# Patient Record
Sex: FEMALE | Race: ASIAN | NOT HISPANIC OR LATINO
[De-identification: names, ages, dates, MRNs, and addresses within clinical notes are randomized per-mention and may not be internally consistent; named-entity substitution may affect disease eponyms.]

---

## 2017-08-11 PROBLEM — Z00.00 ENCOUNTER FOR PREVENTIVE HEALTH EXAMINATION: Status: ACTIVE | Noted: 2017-08-11

## 2017-08-24 ENCOUNTER — APPOINTMENT (OUTPATIENT)
Dept: ORTHOPEDIC SURGERY | Facility: CLINIC | Age: 47
End: 2017-08-24
Payer: COMMERCIAL

## 2017-08-24 VITALS — WEIGHT: 170 LBS | BODY MASS INDEX: 28.32 KG/M2 | HEIGHT: 65 IN

## 2017-08-24 DIAGNOSIS — S73.191A OTHER SPRAIN OF RIGHT HIP, INITIAL ENCOUNTER: ICD-10-CM

## 2017-08-24 PROCEDURE — 73502 X-RAY EXAM HIP UNI 2-3 VIEWS: CPT | Mod: RT

## 2017-08-24 PROCEDURE — 99204 OFFICE O/P NEW MOD 45 MIN: CPT

## 2017-09-04 PROBLEM — S73.191A TEAR OF RIGHT ACETABULAR LABRUM, INITIAL ENCOUNTER: Status: ACTIVE | Noted: 2017-09-04

## 2019-11-19 ENCOUNTER — APPOINTMENT (OUTPATIENT)
Dept: NEUROLOGY | Facility: CLINIC | Age: 49
End: 2019-11-19
Payer: COMMERCIAL

## 2019-11-19 VITALS
HEART RATE: 75 BPM | SYSTOLIC BLOOD PRESSURE: 106 MMHG | WEIGHT: 161 LBS | HEIGHT: 65 IN | BODY MASS INDEX: 26.82 KG/M2 | DIASTOLIC BLOOD PRESSURE: 72 MMHG

## 2019-11-19 DIAGNOSIS — Z78.9 OTHER SPECIFIED HEALTH STATUS: ICD-10-CM

## 2019-11-19 DIAGNOSIS — Z86.39 PERSONAL HISTORY OF OTHER ENDOCRINE, NUTRITIONAL AND METABOLIC DISEASE: ICD-10-CM

## 2019-11-19 DIAGNOSIS — Z86.2 PERSONAL HISTORY OF DISEASES OF THE BLOOD AND BLOOD-FORMING ORGANS AND CERTAIN DISORDERS INVOLVING THE IMMUNE MECHANISM: ICD-10-CM

## 2019-11-19 PROCEDURE — 99204 OFFICE O/P NEW MOD 45 MIN: CPT

## 2019-11-19 RX ORDER — MULTIVITAMIN
TABLET ORAL
Refills: 0 | Status: ACTIVE | COMMUNITY

## 2019-11-19 NOTE — REVIEW OF SYSTEMS
[Fever] : fever [Abnormal Sensation] : an abnormal sensation [Hypersensitivity] : hypersensitivity [As Noted in HPI] : as noted in HPI [Negative] : Endocrine

## 2019-11-19 NOTE — HISTORY OF PRESENT ILLNESS
[FreeTextEntry1] : 49-year-old female with PMH remarkable for mild hyperlipidemia, otherwise healthy, reports that 3 weeks ago she started noticing low grade fevers ranging  usually in the evenings, in addition, she also noted dull headache that started localizing to left temporal region, the headaches occurred a few eyes every day usually in the evenings, headaches were monitored and throbbing in character, she reports\par  2-3 episodes of transient left eye visual blurring while she was driving, which resolves within seconds and reverted back to normal; patient denied jaw claudication, vertigo, nausea, vomiting, paresthesias/weakness of upper and lower extremities or gait instability.\par \par Patient was evaluated by her PMD, labs done revealed ESR 49, CRP > 10, she was started on prednisone 60 mg daily, has been taking it for the last 10 days. \par \par Patient reports she has not had any more fever, denies visual blurring but continues to have dull aching sensation in the left temporal region.

## 2019-11-19 NOTE — PHYSICAL EXAM
[General Appearance - In No Acute Distress] : in no acute distress [General Appearance - Alert] : alert [Oriented To Time, Place, And Person] : oriented to person, place, and time [FreeTextEntry1] : Mild tenderness left temporal region, no palpable TA.  [Impaired Insight] : insight and judgment were intact [Affect] : the affect was normal [Person] : oriented to person [Place] : oriented to place [Concentration Intact] : normal concentrating ability [Time] : oriented to time [Visual Intact] : visual attention was ~T not ~L decreased [Repeating Phrases] : no difficulty repeating a phrase [Naming Objects] : no difficulty naming common objects [Writing A Sentence] : no difficulty writing a sentence [Fluency] : fluency intact [Comprehension] : comprehension intact [Reading] : reading intact [Past History] : adequate knowledge of personal past history [Cranial Nerves Oculomotor (III)] : extraocular motion intact [Cranial Nerves Optic (II)] : visual acuity intact bilaterally,  visual fields full to confrontation, pupils equal round and reactive to light [Cranial Nerves Trigeminal (V)] : facial sensation intact symmetrically [Cranial Nerves Glossopharyngeal (IX)] : tongue and palate midline [Cranial Nerves Vestibulocochlear (VIII)] : hearing was intact bilaterally [Cranial Nerves Facial (VII)] : face symmetrical [Cranial Nerves Hypoglossal (XII)] : there was no tongue deviation with protrusion [Cranial Nerves Accessory (XI - Cranial And Spinal)] : head turning and shoulder shrug symmetric [No Muscle Atrophy] : normal bulk in all four extremities [Motor Strength] : muscle strength was normal in all four extremities [Motor Tone] : muscle tone was normal in all four extremities [Sensation Tactile Decrease] : light touch was intact [Proprioception] : proprioception was intact [Abnormal Walk] : normal gait [Tremor] : no tremor present [Past-pointing] : there was no past-pointing [Balance] : balance was intact [2+] : Patella left 2+ [1+] : Ankle jerk left 1+ [Plantar Reflex Right Only] : normal on the right [Plantar Reflex Left Only] : normal on the left [PERRL With Normal Accommodation] : pupils were equal in size, round, reactive to light, with normal accommodation [Extraocular Movements] : extraocular movements were intact [Full Visual Field] : full visual field [Outer Ear] : the ears and nose were normal in appearance [Papilledema Of Both Eyes] : no papilledema [Hearing Threshold Finger Rub Not [place]] : hearing was normal [Auscultation Breath Sounds / Voice Sounds] : lungs were clear to auscultation bilaterally [Heart Sounds] : normal S1 and S2 [Heart Rate And Rhythm] : heart rate was normal and rhythm regular [No Spinal Tenderness] : no spinal tenderness [Edema] : there was no peripheral edema [Arterial Pulses Carotid] : carotid pulses were normal with no bruits [Involuntary Movements] : no involuntary movements were seen [] : no rash

## 2019-11-19 NOTE — REASON FOR VISIT
[Consultation] : a consultation visit [FreeTextEntry1] : Referred by Dr. Anderson for evaluation of low grade fevers with left eye visual blurring and elevated ESR

## 2019-11-19 NOTE — DISCUSSION/SUMMARY
[FreeTextEntry1] : 49-year-old female with PMHx of mild hyperlipidemia, with 3 weeks history of low grade fevers ranging  degrees usually in the evenings, in addition to dull headache that localized to left temporal region and 2-3 episodes of transient left eye visual blurring, resolved within seconds and reverted back to normal; ESR was 49, CRP > 10. Pt she was started on prednisone 60 mg daily. \par \par # Most likely temporal arteritis/GCA. Review of labs ~ slight decrease in H/H compared with a month ago . Patient has noted remarkable improvement with prednisone high-dose.\par \par - I have recommended continuing prednisone 40 mg daily for one week, then 20 mg daily\par - Consult with rheumatology ASAP, will need for vasculitis workup, taper prednisone per rheumatology based on repeat ESR\par - We will obtain MRI of the brain and MRA of the brain

## 2019-11-20 ENCOUNTER — APPOINTMENT (OUTPATIENT)
Dept: RHEUMATOLOGY | Facility: CLINIC | Age: 49
End: 2019-11-20
Payer: COMMERCIAL

## 2019-11-20 VITALS
BODY MASS INDEX: 27.32 KG/M2 | HEIGHT: 65 IN | WEIGHT: 164 LBS | DIASTOLIC BLOOD PRESSURE: 69 MMHG | HEART RATE: 89 BPM | SYSTOLIC BLOOD PRESSURE: 102 MMHG | OXYGEN SATURATION: 97 %

## 2019-11-20 PROCEDURE — 99205 OFFICE O/P NEW HI 60 MIN: CPT

## 2019-11-21 NOTE — PHYSICAL EXAM
[General Appearance - Alert] : alert [General Appearance - Well Nourished] : well nourished [General Appearance - Well Developed] : well developed [Sclera] : the sclera and conjunctiva were normal [Oropharynx] : the oropharynx was normal [Neck Appearance] : the appearance of the neck was normal [Auscultation Breath Sounds / Voice Sounds] : lungs were clear to auscultation bilaterally [Respiration, Rhythm And Depth] : normal respiratory rhythm and effort [Heart Sounds] : normal S1 and S2 [Full Pulse] : the pedal pulses are present [Edema] : there was no peripheral edema [Abdomen Tenderness] : non-tender [Cervical Lymph Nodes Enlarged Anterior Bilaterally] : anterior cervical [No Spinal Tenderness] : no spinal tenderness [Supraclavicular Lymph Nodes Enlarged Bilaterally] : supraclavicular [Nail Clubbing] : no clubbing  or cyanosis of the fingernails [Abnormal Walk] : normal gait [Motor Tone] : muscle strength and tone were normal [] : no rash [FreeTextEntry1] : FROM neck, shoulders, elbows, wrists, hands, hips, knees, ankles and feet, including the small joints of the hands and feet without any evidence of inflammatory arthritis  [Musculoskeletal - Swelling] : no joint swelling seen [Deep Tendon Reflexes (DTR)] : deep tendon reflexes were 2+ and symmetric [Oriented To Time, Place, And Person] : oriented to person, place, and time [Motor Exam] : the motor exam was normal [Impaired Insight] : insight and judgment were intact [Affect] : the affect was normal

## 2019-11-21 NOTE — REVIEW OF SYSTEMS
[Fever] : fever [Feeling Tired] : feeling tired [Eyesight Problems] : eyesight problems [Negative] : Heme/Lymph

## 2019-11-21 NOTE — CONSULT LETTER
[Consult Letter:] : I had the pleasure of evaluating your patient, [unfilled]. [Dear  ___] : Dear  [unfilled], [Please see my note below.] : Please see my note below. [FreeTextEntry3] : Bo Robison D.O\par  [Sincerely,] : Sincerely, [Consult Closing:] : Thank you very much for allowing me to participate in the care of this patient.  If you have any questions, please do not hesitate to contact me.

## 2019-11-21 NOTE — HISTORY OF PRESENT ILLNESS
[FreeTextEntry1] : 49 year old [place] female comes in as an urgent new patient at the request of neurology.\par \par She states that she was in her usual state of health until about a month ago, at that time she initially started to noticed low-grade fevers with night sweats, she went to see her primary care physician and had labs done and was noted to have a sedimentation rate of 49 and a CRP of 10. She was put on prednisone 40 mg with resolution of symptoms. She went to see neurology yesterday, she was complaining of headaches for the last 2 weeks, with associated blurry vision. Neurology was noted concerned about it until arteritis and referred her to us.\par \par She denies being unwell prior to this month. She denies dry cough, symptoms of jaw claudication, symptoms of stiffness in the shoulders at the hips or malaise. Note, ever since she has been on the prednisone have night sweats and fevers have resolved.\par \par Today she complains of pain on the left side of her head along with symptoms of blurry vision. She did use prednisone 40 mg today.\par \par She has a good appetite and denies weight loss. She denies fevers or chills or night sweats.

## 2019-11-21 NOTE — ASSESSMENT
[FreeTextEntry1] : 49 year old [place] female with no significant past medical history presents for further evaluation as an urgent new patient to rule out giant cell arteritis.\par \par Her current review of systems is positive for low-grade fevers with associated night sweats for the past month, she has been on oral prednisone for the last 2 weeks with resolution of the fevers and night sweats. Of note, she has been complaining of a left-sided headache and visual symptoms for the last few days. She is currently on prednisone 40 mg.\par \par I reviewed her recent labs, her initial sedimentation rate was 49 and CRP was 10.\par \par Today, on examination she has full range of motion of her shoulders and hips. She does have tenderness in the left temporal area with questionable reduced temporal artery pulsation on the left side.\par \par At this time my suspicion for giant cell arteritis is moderate, the only atypical features are her age and race. Despite these 2 factors vasculitis has to be ruled out.\par \par Plan-\par -the diagnosis was discussed at length with her\par -She is to increase the prednisone to 20 mg t.i.d.\par -She is to repeat the inflammatory markers\par -She is going to see vascular surgery for left temporal artery biopsy, I did make her aware that being that she has been on oral prednisone for the last 2 weeks the yield may not be as high\par -She is aware to call me if her visual symptoms worsen\par Risks and benefits of steroids were d/w patient including but not limited to weight gain, diabetes, HTN, avascular necrosis, osteoporosis, glaucoma, cataract, infections and immunosuppression.\par \par -I will be seeing her again in the next 10 days

## 2019-11-26 RX ORDER — OXYCODONE HYDROCHLORIDE 5 MG/1
10 TABLET ORAL ONCE
Refills: 0 | Status: DISCONTINUED | OUTPATIENT
Start: 2019-11-27 | End: 2019-11-27

## 2019-11-26 RX ORDER — FENTANYL CITRATE 50 UG/ML
50 INJECTION INTRAVENOUS
Refills: 0 | Status: DISCONTINUED | OUTPATIENT
Start: 2019-11-27 | End: 2019-11-27

## 2019-11-26 RX ORDER — SODIUM CHLORIDE 9 MG/ML
1000 INJECTION INTRAMUSCULAR; INTRAVENOUS; SUBCUTANEOUS
Refills: 0 | Status: DISCONTINUED | OUTPATIENT
Start: 2019-11-27 | End: 2019-11-27

## 2019-11-26 RX ORDER — HYDROMORPHONE HYDROCHLORIDE 2 MG/ML
0.5 INJECTION INTRAMUSCULAR; INTRAVENOUS; SUBCUTANEOUS
Refills: 0 | Status: DISCONTINUED | OUTPATIENT
Start: 2019-11-27 | End: 2019-11-27

## 2019-11-27 ENCOUNTER — RESULT REVIEW (OUTPATIENT)
Age: 49
End: 2019-11-27

## 2019-11-27 ENCOUNTER — OUTPATIENT (OUTPATIENT)
Dept: INPATIENT UNIT | Facility: HOSPITAL | Age: 49
LOS: 1 days | Discharge: HOME | End: 2019-11-27
Payer: COMMERCIAL

## 2019-11-27 VITALS
HEIGHT: 65 IN | OXYGEN SATURATION: 100 % | HEART RATE: 64 BPM | WEIGHT: 160.06 LBS | TEMPERATURE: 98 F | SYSTOLIC BLOOD PRESSURE: 121 MMHG | RESPIRATION RATE: 15 BRPM | DIASTOLIC BLOOD PRESSURE: 84 MMHG

## 2019-11-27 VITALS
HEART RATE: 69 BPM | OXYGEN SATURATION: 100 % | SYSTOLIC BLOOD PRESSURE: 121 MMHG | RESPIRATION RATE: 16 BRPM | TEMPERATURE: 98 F | DIASTOLIC BLOOD PRESSURE: 84 MMHG

## 2019-11-27 DIAGNOSIS — Z90.710 ACQUIRED ABSENCE OF BOTH CERVIX AND UTERUS: Chronic | ICD-10-CM

## 2019-11-27 DIAGNOSIS — M31.6 OTHER GIANT CELL ARTERITIS: ICD-10-CM

## 2019-11-27 LAB
ANION GAP SERPL CALC-SCNC: 5 MMOL/L — SIGNIFICANT CHANGE UP (ref 5–17)
BUN SERPL-MCNC: 17 MG/DL — SIGNIFICANT CHANGE UP (ref 7–23)
CALCIUM SERPL-MCNC: 9 MG/DL — SIGNIFICANT CHANGE UP (ref 8.5–10.1)
CHLORIDE SERPL-SCNC: 104 MMOL/L — SIGNIFICANT CHANGE UP (ref 96–108)
CO2 SERPL-SCNC: 29 MMOL/L — SIGNIFICANT CHANGE UP (ref 22–31)
CREAT SERPL-MCNC: 0.78 MG/DL — SIGNIFICANT CHANGE UP (ref 0.5–1.3)
GLUCOSE SERPL-MCNC: 88 MG/DL — SIGNIFICANT CHANGE UP (ref 70–99)
HCT VFR BLD CALC: 45.7 % — HIGH (ref 34.5–45)
HGB BLD-MCNC: 14.1 G/DL — SIGNIFICANT CHANGE UP (ref 11.5–15.5)
MCHC RBC-ENTMCNC: 27.8 PG — SIGNIFICANT CHANGE UP (ref 27–34)
MCHC RBC-ENTMCNC: 30.9 GM/DL — LOW (ref 32–36)
MCV RBC AUTO: 90 FL — SIGNIFICANT CHANGE UP (ref 80–100)
PLATELET # BLD AUTO: 205 K/UL — SIGNIFICANT CHANGE UP (ref 150–400)
POTASSIUM SERPL-MCNC: 3.9 MMOL/L — SIGNIFICANT CHANGE UP (ref 3.5–5.3)
POTASSIUM SERPL-SCNC: 3.9 MMOL/L — SIGNIFICANT CHANGE UP (ref 3.5–5.3)
RBC # BLD: 5.08 M/UL — SIGNIFICANT CHANGE UP (ref 3.8–5.2)
RBC # FLD: 14.5 % — SIGNIFICANT CHANGE UP (ref 10.3–14.5)
SODIUM SERPL-SCNC: 138 MMOL/L — SIGNIFICANT CHANGE UP (ref 135–145)
WBC # BLD: 9.19 K/UL — SIGNIFICANT CHANGE UP (ref 3.8–10.5)
WBC # FLD AUTO: 9.19 K/UL — SIGNIFICANT CHANGE UP (ref 3.8–10.5)

## 2019-11-27 PROCEDURE — 86850 RBC ANTIBODY SCREEN: CPT

## 2019-11-27 PROCEDURE — 88305 TISSUE EXAM BY PATHOLOGIST: CPT | Mod: 26

## 2019-11-27 PROCEDURE — 85027 COMPLETE CBC AUTOMATED: CPT

## 2019-11-27 PROCEDURE — 36415 COLL VENOUS BLD VENIPUNCTURE: CPT

## 2019-11-27 PROCEDURE — 86900 BLOOD TYPING SEROLOGIC ABO: CPT

## 2019-11-27 PROCEDURE — 88305 TISSUE EXAM BY PATHOLOGIST: CPT

## 2019-11-27 PROCEDURE — 93010 ELECTROCARDIOGRAM REPORT: CPT

## 2019-11-27 PROCEDURE — 93005 ELECTROCARDIOGRAM TRACING: CPT

## 2019-11-27 PROCEDURE — 86901 BLOOD TYPING SEROLOGIC RH(D): CPT

## 2019-11-27 PROCEDURE — 80048 BASIC METABOLIC PNL TOTAL CA: CPT

## 2019-11-27 RX ORDER — OXYCODONE HYDROCHLORIDE 5 MG/1
5 TABLET ORAL ONCE
Refills: 0 | Status: DISCONTINUED | OUTPATIENT
Start: 2019-11-27 | End: 2019-11-27

## 2019-11-27 RX ADMIN — HYDROMORPHONE HYDROCHLORIDE 0.5 MILLIGRAM(S): 2 INJECTION INTRAMUSCULAR; INTRAVENOUS; SUBCUTANEOUS at 11:00

## 2019-11-27 RX ADMIN — OXYCODONE HYDROCHLORIDE 5 MILLIGRAM(S): 5 TABLET ORAL at 11:35

## 2019-11-27 RX ADMIN — HYDROMORPHONE HYDROCHLORIDE 0.5 MILLIGRAM(S): 2 INJECTION INTRAMUSCULAR; INTRAVENOUS; SUBCUTANEOUS at 08:45

## 2019-11-27 RX ADMIN — OXYCODONE HYDROCHLORIDE 5 MILLIGRAM(S): 5 TABLET ORAL at 10:52

## 2019-11-27 NOTE — ASU DISCHARGE PLAN (ADULT/PEDIATRIC) - ASU DC SPECIAL INSTRUCTIONSFT
- Please keep steristrips in place.  - Wash the wound with soap and water from POD 2  - the stristrisps will fall off by themselves or you can take them off after 1 week.

## 2019-11-27 NOTE — ASU DISCHARGE PLAN (ADULT/PEDIATRIC) - CARE PROVIDER_API CALL
Rei Dumas)  Surgery; Surgical Critical Care  [address] [place] [address]  Phone: [phone]  Fax: [phone]  Follow Up Time: 2 weeks

## 2019-12-03 DIAGNOSIS — Z71.1 PERSON WITH FEARED HEALTH COMPLAINT IN WHOM NO DIAGNOSIS IS MADE: ICD-10-CM

## 2019-12-03 DIAGNOSIS — M31.6 OTHER GIANT CELL ARTERITIS: ICD-10-CM

## 2019-12-03 DIAGNOSIS — R51 HEADACHE: ICD-10-CM

## 2019-12-06 ENCOUNTER — RX RENEWAL (OUTPATIENT)
Age: 49
End: 2019-12-06

## 2019-12-06 ENCOUNTER — APPOINTMENT (OUTPATIENT)
Dept: RHEUMATOLOGY | Facility: CLINIC | Age: 49
End: 2019-12-06
Payer: COMMERCIAL

## 2019-12-06 VITALS — DIASTOLIC BLOOD PRESSURE: 70 MMHG | SYSTOLIC BLOOD PRESSURE: 110 MMHG

## 2019-12-06 PROBLEM — R51 HEADACHE: Chronic | Status: ACTIVE | Noted: 2019-11-26

## 2019-12-06 PROCEDURE — 99214 OFFICE O/P EST MOD 30 MIN: CPT

## 2019-12-06 NOTE — PHYSICAL EXAM
[General Appearance - Alert] : alert [Sclera] : the sclera and conjunctiva were normal [General Appearance - Well Developed] : well developed [General Appearance - Well Nourished] : well nourished [Oropharynx] : the oropharynx was normal [Auscultation Breath Sounds / Voice Sounds] : lungs were clear to auscultation bilaterally [Respiration, Rhythm And Depth] : normal respiratory rhythm and effort [Neck Appearance] : the appearance of the neck was normal [Heart Sounds] : normal S1 and S2 [Full Pulse] : the pedal pulses are present [Edema] : there was no peripheral edema [Abdomen Tenderness] : non-tender [Cervical Lymph Nodes Enlarged Anterior Bilaterally] : anterior cervical [Supraclavicular Lymph Nodes Enlarged Bilaterally] : supraclavicular [No Spinal Tenderness] : no spinal tenderness [Abnormal Walk] : normal gait [Musculoskeletal - Swelling] : no joint swelling seen [Nail Clubbing] : no clubbing  or cyanosis of the fingernails [Motor Tone] : muscle strength and tone were normal [Deep Tendon Reflexes (DTR)] : deep tendon reflexes were 2+ and symmetric [] : no rash [Impaired Insight] : insight and judgment were intact [Motor Exam] : the motor exam was normal [Oriented To Time, Place, And Person] : oriented to person, place, and time [Affect] : the affect was normal [FreeTextEntry1] : FROM neck, shoulders, elbows, wrists, hands, hips, knees, ankles and feet, including the small joints of the hands and feet without any evidence of inflammatory arthritis

## 2019-12-06 NOTE — ASSESSMENT
[FreeTextEntry1] : 49 year old Armenian female with no significant past medical history initially seen for further evaluation as an urgent new patient to rule out giant cell arteritis.\par \par Her current review of systems is positive for low-grade fevers with associated night sweats for the past month, she has been on oral prednisone for the last 4 weeks with resolution of the fevers and night sweats. Of note, she was visual sx and is now on prednisone 60 mg for past 2 weeks with improvemnet in sx and her TA bx is negative. \par \par I reviewed her recent labs, her initial sedimentation rate was 49 and CRP was 10.\par \par Today, on examination she has full range of motion of her shoulders and hips. She does have tenderness in the left temporal area with questionable reduced temporal artery pulsation on the left side.\par \par At this time my suspicion for giant cell arteritis is moderate, the only atypical features are her age and race. Despite these 2 factors vasculitis has to be ruled out.\par \par Temporal arteritis-\par -despite having a negative biopsy my suspicion for temporal arteritis is moderate to high, she was symptomatic for about 4 weeks prior to biopsy and had been on steroids for 3 weeks so the biopsy may not be truly diagnostic. \par \par -She will continue prednisone 60 mg for one month in the lower to 40 mg\par -At this time I feel that she will be a good candidate for actemra\par -She will have labs and chest x-ray done prior to getting approval\par -risks and benefits of medication were discussed with her including immunosuppression, reactivation of tuberculosis, high risk for GI perforation and elevation of lipid panel. She is willing to proceed.\par \par \par Steroid use\par Risks and benefits of steroids were d/w patient including but not limited to weight gain, diabetes, HTN, avascular necrosis, osteoporosis, glaucoma, cataract, infections and immunosuppression.\par \par Followup 4 weeks. She is aware to call if her symptoms worsen\par

## 2019-12-06 NOTE — HISTORY OF PRESENT ILLNESS
[FreeTextEntry1] : Followup visit\par Patient comes in with her  today. She is now status post temporal artery biopsy on the left side which was negative. She is using prednisone 60 mg. Overall she is better. She denies any new symptoms. She denies temporal pain in the scalp tenderness, she does feel pressure in her eye, she did call me yesterday stating that she had pain at the site of the stitches, I  told her to use Excedrin with resolution of symptoms.\par \par She admits to having adverse effects of the prednisone, she is eating excessively and cannot sleep, her  states that her mood is very labile.\par \par She denies prolonged morning stiffness. She denies pain in her shoulders or hips. She has an average appetite and denies weight loss.\par \par She denies jaw claudication or night sweats.\par \par She is planning to travel to [place] at the end of this month for about 3 weeks.

## 2019-12-06 NOTE — REVIEW OF SYSTEMS
[Fever] : fever [Eyesight Problems] : eyesight problems [Feeling Tired] : feeling tired [Negative] : Endocrine

## 2019-12-06 NOTE — CONSULT LETTER
[Dear  ___] : Dear  [unfilled], [Consult Letter:] : I had the pleasure of evaluating your patient, [unfilled]. [Please see my note below.] : Please see my note below. [Sincerely,] : Sincerely, [Consult Closing:] : Thank you very much for allowing me to participate in the care of this patient.  If you have any questions, please do not hesitate to contact me. [FreeTextEntry3] : Bo Robison D.O\par

## 2019-12-08 ENCOUNTER — FORM ENCOUNTER (OUTPATIENT)
Age: 49
End: 2019-12-08

## 2019-12-09 ENCOUNTER — APPOINTMENT (OUTPATIENT)
Dept: RADIOLOGY | Facility: CLINIC | Age: 49
End: 2019-12-09
Payer: COMMERCIAL

## 2019-12-09 ENCOUNTER — OUTPATIENT (OUTPATIENT)
Dept: OUTPATIENT SERVICES | Facility: HOSPITAL | Age: 49
LOS: 1 days | End: 2019-12-09
Payer: COMMERCIAL

## 2019-12-09 DIAGNOSIS — Z79.52 LONG TERM (CURRENT) USE OF SYSTEMIC STEROIDS: ICD-10-CM

## 2019-12-09 DIAGNOSIS — M31.6 OTHER GIANT CELL ARTERITIS: ICD-10-CM

## 2019-12-09 DIAGNOSIS — Z90.710 ACQUIRED ABSENCE OF BOTH CERVIX AND UTERUS: Chronic | ICD-10-CM

## 2019-12-09 DIAGNOSIS — Z00.00 ENCOUNTER FOR GENERAL ADULT MEDICAL EXAMINATION WITHOUT ABNORMAL FINDINGS: ICD-10-CM

## 2019-12-09 PROCEDURE — 77080 DXA BONE DENSITY AXIAL: CPT | Mod: 26

## 2019-12-09 PROCEDURE — 71046 X-RAY EXAM CHEST 2 VIEWS: CPT | Mod: 26

## 2019-12-09 PROCEDURE — 77080 DXA BONE DENSITY AXIAL: CPT

## 2019-12-09 PROCEDURE — 71046 X-RAY EXAM CHEST 2 VIEWS: CPT

## 2019-12-11 ENCOUNTER — FORM ENCOUNTER (OUTPATIENT)
Age: 49
End: 2019-12-11

## 2019-12-12 ENCOUNTER — APPOINTMENT (OUTPATIENT)
Dept: MRI IMAGING | Facility: CLINIC | Age: 49
End: 2019-12-12
Payer: COMMERCIAL

## 2019-12-12 ENCOUNTER — OUTPATIENT (OUTPATIENT)
Dept: OUTPATIENT SERVICES | Facility: HOSPITAL | Age: 49
LOS: 1 days | End: 2019-12-12
Payer: COMMERCIAL

## 2019-12-12 ENCOUNTER — RX RENEWAL (OUTPATIENT)
Age: 49
End: 2019-12-12

## 2019-12-12 DIAGNOSIS — M31.6 OTHER GIANT CELL ARTERITIS: ICD-10-CM

## 2019-12-12 DIAGNOSIS — Z90.710 ACQUIRED ABSENCE OF BOTH CERVIX AND UTERUS: Chronic | ICD-10-CM

## 2019-12-12 PROCEDURE — 70551 MRI BRAIN STEM W/O DYE: CPT | Mod: 26

## 2019-12-12 PROCEDURE — 70544 MR ANGIOGRAPHY HEAD W/O DYE: CPT | Mod: 26,59

## 2019-12-12 PROCEDURE — 70544 MR ANGIOGRAPHY HEAD W/O DYE: CPT

## 2019-12-12 PROCEDURE — 70551 MRI BRAIN STEM W/O DYE: CPT

## 2019-12-16 ENCOUNTER — APPOINTMENT (OUTPATIENT)
Dept: NEUROLOGY | Facility: CLINIC | Age: 49
End: 2019-12-16
Payer: COMMERCIAL

## 2019-12-16 VITALS
DIASTOLIC BLOOD PRESSURE: 82 MMHG | BODY MASS INDEX: 26.66 KG/M2 | SYSTOLIC BLOOD PRESSURE: 150 MMHG | HEIGHT: 65 IN | HEART RATE: 73 BPM | WEIGHT: 160 LBS

## 2019-12-16 PROCEDURE — 99214 OFFICE O/P EST MOD 30 MIN: CPT

## 2019-12-16 NOTE — PHYSICAL EXAM
[General Appearance - Alert] : alert [General Appearance - In No Acute Distress] : in no acute distress [Oriented To Time, Place, And Person] : oriented to person, place, and time [Impaired Insight] : insight and judgment were intact [Affect] : the affect was normal [Person] : oriented to person [Place] : oriented to place [Time] : oriented to time [Concentration Intact] : normal concentrating ability [Visual Intact] : visual attention was ~T not ~L decreased [Repeating Phrases] : no difficulty repeating a phrase [Naming Objects] : no difficulty naming common objects [Fluency] : fluency intact [Writing A Sentence] : no difficulty writing a sentence [Comprehension] : comprehension intact [Reading] : reading intact [Past History] : adequate knowledge of personal past history [Cranial Nerves Optic (II)] : visual acuity intact bilaterally,  visual fields full to confrontation, pupils equal round and reactive to light [Cranial Nerves Oculomotor (III)] : extraocular motion intact [Cranial Nerves Trigeminal (V)] : facial sensation intact symmetrically [Cranial Nerves Facial (VII)] : face symmetrical [Cranial Nerves Vestibulocochlear (VIII)] : hearing was intact bilaterally [Cranial Nerves Glossopharyngeal (IX)] : tongue and palate midline [Cranial Nerves Accessory (XI - Cranial And Spinal)] : head turning and shoulder shrug symmetric [Cranial Nerves Hypoglossal (XII)] : there was no tongue deviation with protrusion [Motor Tone] : muscle tone was normal in all four extremities [No Muscle Atrophy] : normal bulk in all four extremities [Sensation Tactile Decrease] : light touch was intact [Motor Strength] : muscle strength was normal in all four extremities [Proprioception] : proprioception was intact [Abnormal Walk] : normal gait [Balance] : balance was intact [2+] : Patella left 2+ [1+] : Ankle jerk left 1+ [Extraocular Movements] : extraocular movements were intact [PERRL With Normal Accommodation] : pupils were equal in size, round, reactive to light, with normal accommodation [Full Visual Field] : full visual field [Hearing Threshold Finger Rub Not [place]] : hearing was normal [] : no respiratory distress [Outer Ear] : the ears and nose were normal in appearance [Heart Rate And Rhythm] : heart rate was normal and rhythm regular [Heart Sounds] : normal S1 and S2 [Auscultation Breath Sounds / Voice Sounds] : lungs were clear to auscultation bilaterally [Edema] : there was no peripheral edema [Arterial Pulses Carotid] : carotid pulses were normal with no bruits [Past-pointing] : there was no past-pointing [Tremor] : no tremor present [Plantar Reflex Right Only] : normal on the right [Plantar Reflex Left Only] : normal on the left [Papilledema Of Both Eyes] : no papilledema [FreeTextEntry1] : Left TAB scar +

## 2019-12-16 NOTE — DISCUSSION/SUMMARY
[FreeTextEntry1] : 49-year-old F with PMHx of mild hyperlipidemia, presented with 3 weeks history of low grade fevers ranging  degrees, in addition to dull headache localized to left temporal region and 2-3 episodes of transient left eye visual blurring; ESR was 49, CRP > 10. Pt she was started on prednisone 60 mg daily. \par \par # Most likely temporal arteritis/GCA; repeat ESR 2, TAB neg per pt, resolution of visual blurring with prednisone high-dose and Acterma.\par \par # Cephalgia - likely related to GCA; MRI/MRA brain no evidence of vasculitis or acute infract\par \par - I have recommended continuing prednisone and Acterma per Dr. Robison\par - Tylenol/NSAIDs for headaches\par - GI prophylaxis for prednisone

## 2019-12-16 NOTE — REASON FOR VISIT
[Follow-Up: _____] : a [unfilled] follow-up visit [FreeTextEntry1] : For temporal arteritis and discuss MRI/MRA brain

## 2019-12-16 NOTE — DATA REVIEWED
[de-identified] : 12/12/19: MRI brain unremarkable\par MRA brain unremarkable for aneurysms vascular lesions or stenosis, some variant noted [de-identified] : 12/7/19: ESR 2\par 11/6/19: A1c 6.0, ESR 49, CRP > 10, TSH 0.05, T4 13.6, free T4 41.8, Lyme - neg

## 2019-12-16 NOTE — HISTORY OF PRESENT ILLNESS
[FreeTextEntry1] : Patient is here for follow up visit today, was last seen on 11/19/19. After the last visit, patient was referred to rheumatology Dr. Enriqueta MARTIN for management of temporal arteritis, she was started on prednisone 60 mg daily, she has been taking the medication and is currently on the same dose, she received one Injection of Acterma, per rheumatology will reduce the dose of prednisone to 40 mgs.\par \par Patient had left temporal artery biopsy, it was negative as per the patient, repeat ESR is 2.\par \par Patient reports she continues to have left scalp tenderness and pain, she also has left-sided headaches, but has noted resolution of visual blurriness and fever.\par \par Patient had MRI and MRA of the brain, no acute findings noted

## 2020-01-24 ENCOUNTER — APPOINTMENT (OUTPATIENT)
Dept: NEUROLOGY | Facility: CLINIC | Age: 50
End: 2020-01-24
Payer: COMMERCIAL

## 2020-01-24 VITALS
HEIGHT: 64 IN | WEIGHT: 175 LBS | DIASTOLIC BLOOD PRESSURE: 80 MMHG | HEART RATE: 74 BPM | SYSTOLIC BLOOD PRESSURE: 112 MMHG | BODY MASS INDEX: 29.88 KG/M2

## 2020-01-24 PROCEDURE — 99214 OFFICE O/P EST MOD 30 MIN: CPT

## 2020-01-24 RX ORDER — CYCLOBENZAPRINE HYDROCHLORIDE 5 MG/1
5 TABLET, FILM COATED ORAL
Qty: 60 | Refills: 1 | Status: DISCONTINUED | COMMUNITY
Start: 2019-12-13 | End: 2020-01-24

## 2020-01-24 NOTE — DATA REVIEWED
[de-identified] : 12/7/19: ESR 2\par 11/6/19: A1c 6.0, ESR 49, CRP > 10, TSH 0.05, T4 13.6, free T4 41.8, Lyme - neg [de-identified] : 12/12/19: MRI brain unremarkable\par MRA brain unremarkable for aneurysms vascular lesions or stenosis, some variant noted

## 2020-01-24 NOTE — HISTORY OF PRESENT ILLNESS
[FreeTextEntry1] : Patient is here for follow up visit today, was last seen on 12/16/19. Patient reports that she continues to have left temporal pain in addition to aching pain and tenderness in the left temporal scalp and TAB scar, she also has daily throbbing fronto-occipital headaches, has been taking NSAIDs frequent. She denies visual blurring double vision or jaw claudication.\par \par Patient is currently on prednisone 30 mg daily in addition receives Acterma weekly injections, she has follow-up with rheumatology next week

## 2020-01-24 NOTE — DISCUSSION/SUMMARY
[FreeTextEntry1] : 49-year-old F with PMHx of mild hyperlipidemia, presented with 3 weeks history of low grade fevers ranging  degrees, in addition to dull headache localized to left temporal region and 2-3 episodes of transient left eye visual blurring; ESR was 49, CRP > 10. Pt she was started on prednisone 60 mg daily. \par \par # Most likely temporal arteritis/GCA; repeat ESR 2, TAB neg per pt, resolution of visual blurring with prednisone high-dose and Acterma.\par \par # Cephalgia - could be independent of GCA, Tension headache or related to high dose prednisone use; MRI/MRA brain no evidence of vasculitis or acute infract\par \par - I have recommended continuing trial with Nortriptyline 10 mg HS, Adverse side effects discussed with the patient, recommended not to use  excessive NSAIDs may cause bleeding\par - F/U with Dr. Robison for tapering prednisone and Acterma and f/u ESR

## 2020-01-24 NOTE — REVIEW OF SYSTEMS
[Abnormal Sensation] : an abnormal sensation [As Noted in HPI] : as noted in HPI [Hypersensitivity] : hypersensitivity [Tension Headache] : tension-type headaches [Negative] : Heme/Lymph [Fever] : no fever

## 2020-01-24 NOTE — PHYSICAL EXAM
[General Appearance - In No Acute Distress] : in no acute distress [General Appearance - Alert] : alert [Oriented To Time, Place, And Person] : oriented to person, place, and time [Impaired Insight] : insight and judgment were intact [Affect] : the affect was normal [Person] : oriented to person [Time] : oriented to time [Place] : oriented to place [Repeating Phrases] : no difficulty repeating a phrase [Concentration Intact] : normal concentrating ability [Visual Intact] : visual attention was ~T not ~L decreased [Naming Objects] : no difficulty naming common objects [Comprehension] : comprehension intact [Fluency] : fluency intact [Writing A Sentence] : no difficulty writing a sentence [Reading] : reading intact [Cranial Nerves Oculomotor (III)] : extraocular motion intact [Past History] : adequate knowledge of personal past history [Cranial Nerves Optic (II)] : visual acuity intact bilaterally,  visual fields full to confrontation, pupils equal round and reactive to light [Cranial Nerves Trigeminal (V)] : facial sensation intact symmetrically [Cranial Nerves Facial (VII)] : face symmetrical [Cranial Nerves Glossopharyngeal (IX)] : tongue and palate midline [Cranial Nerves Vestibulocochlear (VIII)] : hearing was intact bilaterally [Cranial Nerves Accessory (XI - Cranial And Spinal)] : head turning and shoulder shrug symmetric [Cranial Nerves Hypoglossal (XII)] : there was no tongue deviation with protrusion [Motor Tone] : muscle tone was normal in all four extremities [Motor Strength] : muscle strength was normal in all four extremities [Sensation Tactile Decrease] : light touch was intact [No Muscle Atrophy] : normal bulk in all four extremities [Balance] : balance was intact [Abnormal Walk] : normal gait [Proprioception] : proprioception was intact [2+] : Patella left 2+ [1+] : Ankle jerk left 1+ [PERRL With Normal Accommodation] : pupils were equal in size, round, reactive to light, with normal accommodation [Extraocular Movements] : extraocular movements were intact [Full Visual Field] : full visual field [Outer Ear] : the ears and nose were normal in appearance [Hearing Threshold Finger Rub Not Towner] : hearing was normal [Heart Rate And Rhythm] : heart rate was normal and rhythm regular [Auscultation Breath Sounds / Voice Sounds] : lungs were clear to auscultation bilaterally [] : no respiratory distress [Edema] : there was no peripheral edema [Heart Sounds] : normal S1 and S2 [Arterial Pulses Carotid] : carotid pulses were normal with no bruits [Past-pointing] : there was no past-pointing [Tremor] : no tremor present [Plantar Reflex Left Only] : normal on the left [Plantar Reflex Right Only] : normal on the right [Papilledema Of Both Eyes] : no papilledema [FreeTextEntry1] : Left TAB scar +

## 2020-01-29 ENCOUNTER — APPOINTMENT (OUTPATIENT)
Dept: RHEUMATOLOGY | Facility: CLINIC | Age: 50
End: 2020-01-29
Payer: COMMERCIAL

## 2020-01-29 VITALS
HEIGHT: 65 IN | SYSTOLIC BLOOD PRESSURE: 100 MMHG | DIASTOLIC BLOOD PRESSURE: 80 MMHG | HEART RATE: 76 BPM | OXYGEN SATURATION: 98 % | TEMPERATURE: 98 F | WEIGHT: 170 LBS | BODY MASS INDEX: 28.32 KG/M2

## 2020-01-29 PROCEDURE — 99214 OFFICE O/P EST MOD 30 MIN: CPT

## 2020-01-29 RX ORDER — CIPROFLOXACIN HYDROCHLORIDE 500 MG/1
500 TABLET, FILM COATED ORAL
Qty: 20 | Refills: 0 | Status: DISCONTINUED | COMMUNITY
Start: 2019-12-17

## 2020-01-29 RX ORDER — PREDNISONE 20 MG/1
20 TABLET ORAL DAILY
Qty: 21 | Refills: 0 | Status: DISCONTINUED | COMMUNITY
Start: 2019-11-19 | End: 2020-01-29

## 2020-01-29 NOTE — HISTORY OF PRESENT ILLNESS
[FreeTextEntry1] : Followup visit\par She is now status post temporal artery biopsy on the left side which was negative. She is using prednisone 30 mg and has done 4 injections of actemra without any issues.  Overall she is better. She denies any new symptoms. She denies temporal pain in the scalp tenderness.\par She denies prolonged morning stiffness. She denies pain in her shoulders or hips. She has an average appetite and denies weight loss.\par \par She denies jaw claudication or night sweats.\par \par The main issue at this time is headaches. She describes it as a different pain from her temporal artery pain. It is on the top of her head, I have spoken to her on multiple occasions about it now. She tried Excedrin with minimal relief. She also tried muscle elaxants with minimal relief. She states that Advil gives her relief. She saw neurology in, they suggested nortriptyline. She has apprehensions about using it.

## 2020-01-29 NOTE — PHYSICAL EXAM
[General Appearance - Alert] : alert [General Appearance - Well Nourished] : well nourished [Sclera] : the sclera and conjunctiva were normal [General Appearance - Well Developed] : well developed [Oropharynx] : the oropharynx was normal [Neck Appearance] : the appearance of the neck was normal [Respiration, Rhythm And Depth] : normal respiratory rhythm and effort [Auscultation Breath Sounds / Voice Sounds] : lungs were clear to auscultation bilaterally [Heart Sounds] : normal S1 and S2 [Full Pulse] : the pedal pulses are present [Edema] : there was no peripheral edema [Supraclavicular Lymph Nodes Enlarged Bilaterally] : supraclavicular [Abdomen Tenderness] : non-tender [Cervical Lymph Nodes Enlarged Anterior Bilaterally] : anterior cervical [No Spinal Tenderness] : no spinal tenderness [Nail Clubbing] : no clubbing  or cyanosis of the fingernails [Abnormal Walk] : normal gait [Motor Tone] : muscle strength and tone were normal [Musculoskeletal - Swelling] : no joint swelling seen [FreeTextEntry1] : FROM neck, shoulders, elbows, wrists, hands, hips, knees, ankles and feet, including the small joints of the hands and feet without any evidence of inflammatory arthritis  [] : no rash [Oriented To Time, Place, And Person] : oriented to person, place, and time [Deep Tendon Reflexes (DTR)] : deep tendon reflexes were 2+ and symmetric [Motor Exam] : the motor exam was normal [Impaired Insight] : insight and judgment were intact [Affect] : the affect was normal

## 2020-01-29 NOTE — ASSESSMENT
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 At this time my suspicion for giant cell arteritis is moderate, the only atypical features are her age and race. \par \par Temporal arteritis-\par -despite having a negative biopsy my suspicion for temporal arteritis is moderate to high, she was symptomatic for about 4 weeks prior to biopsy and had been on steroids for 3 weeks so the biopsy may not be truly diagnostic. \par - to continue weekly actemra\par - to lower prednisone to 20 mg for 1 week, followed by 15 mg for week #2, and then 10 mg. \par \par Steroid use\par Risks and benefits of steroids were d/w patient including but not limited to weight gain, diabetes, HTN, avascular necrosis, osteoporosis, glaucoma, cataract, infections and immunosuppression.\par \par Cephalgia-\par This is the main issue at this time. She did see neurology. They recommend nortriptyline. I suggest that she pick it up as well. Her current headaches seem to be different from her temporal arteritis headaches. It may be related to stress,? Related to prednisone use, we'll taper prednisone rapidly and see how she does.\par \par 2 repeat labs today\par \par Followup 4 weeks. She is aware to call if her symptoms worsen\par

## 2020-01-31 ENCOUNTER — APPOINTMENT (OUTPATIENT)
Dept: RHEUMATOLOGY | Facility: CLINIC | Age: 50
End: 2020-01-31

## 2020-02-18 ENCOUNTER — APPOINTMENT (OUTPATIENT)
Dept: RHEUMATOLOGY | Facility: CLINIC | Age: 50
End: 2020-02-18
Payer: COMMERCIAL

## 2020-02-18 VITALS
WEIGHT: 170 LBS | OXYGEN SATURATION: 97 % | HEIGHT: 65 IN | HEART RATE: 88 BPM | DIASTOLIC BLOOD PRESSURE: 70 MMHG | BODY MASS INDEX: 28.32 KG/M2 | SYSTOLIC BLOOD PRESSURE: 100 MMHG | TEMPERATURE: 98.5 F

## 2020-02-18 PROCEDURE — 99214 OFFICE O/P EST MOD 30 MIN: CPT

## 2020-02-18 RX ORDER — PREDNISONE 20 MG/1
20 TABLET ORAL 3 TIMES DAILY
Qty: 270 | Refills: 0 | Status: DISCONTINUED | COMMUNITY
End: 2020-02-18

## 2020-02-18 RX ORDER — PREDNISONE 5 MG/1
5 TABLET ORAL 3 TIMES DAILY
Qty: 90 | Refills: 0 | Status: DISCONTINUED | COMMUNITY
Start: 2020-01-29 | End: 2020-02-18

## 2020-02-18 RX ORDER — NORTRIPTYLINE HYDROCHLORIDE 10 MG/1
10 CAPSULE ORAL
Qty: 30 | Refills: 2 | Status: DISCONTINUED | COMMUNITY
Start: 2020-01-24 | End: 2020-02-18

## 2020-02-18 NOTE — REVIEW OF SYSTEMS
[Feeling Tired] : feeling tired [Fever] : fever [Eyesight Problems] : eyesight problems [Negative] : Endocrine

## 2020-02-18 NOTE — ASSESSMENT
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 At this time my suspicion for giant cell arteritis is moderate, the only atypical features are her age and race. \par \par Temporal arteritis-\par -despite having a negative biopsy my suspicion for temporal arteritis is moderate to high, she was symptomatic for about 4 weeks prior to biopsy and had been on steroids for 3 weeks so the biopsy may not be truly diagnostic. \par - to continue weekly actemra\par - to lower prednisone , She is currently on 10 mg to lower by 2.5 mg q. 10 days to off\par \par Steroid use\par Risks and benefits of steroids were d/w patient including but not limited to weight gain, diabetes, HTN, avascular necrosis, osteoporosis, glaucoma, cataract, infections and immunosuppression.\par \par Cephalgia-\par Resolved at this time, may have improved with nortriptyline which he has stopped. It may also have improved with tapering the steroid dose. She states that she had a sinus infection as well.\par 2 repeat labs today\par \par Followup 6 weeks. She is aware to call if her symptoms worsen\par

## 2020-02-18 NOTE — HISTORY OF PRESENT ILLNESS
[FreeTextEntry1] : Followup visit\par she is feeling much better. She is on prednisone 10 mg for the last week. She continues to use the weekly injection. She states that the nortriptyline helped with the headache, she feels that lowering the prednisone has also helped. She also went to see her primary care physician and was told that she had a sinus infection and was treated with antibiotics.\par She rates her headache at a 1/10. Occasionally when she is stressed she feels some pressure in the left temple area. She denies any jaw pain. She denies visual symptoms. She denies fatigue. She denies stiffness in her shoulders or hips.\par \par She denies any recent infections except for the sinus infection. She denies fevers or chills or night sweats. She has a good appetite. She wants followup she will be on the medication.

## 2020-05-01 ENCOUNTER — APPOINTMENT (OUTPATIENT)
Dept: RHEUMATOLOGY | Facility: CLINIC | Age: 50
End: 2020-05-01
Payer: COMMERCIAL

## 2020-05-01 DIAGNOSIS — R51 HEADACHE: ICD-10-CM

## 2020-05-01 PROCEDURE — 99213 OFFICE O/P EST LOW 20 MIN: CPT | Mod: 95

## 2020-05-01 RX ORDER — OMEPRAZOLE 40 MG/1
40 CAPSULE, DELAYED RELEASE ORAL
Qty: 90 | Refills: 1 | Status: DISCONTINUED | COMMUNITY
Start: 2020-01-29 | End: 2020-05-01

## 2020-05-01 NOTE — HISTORY OF PRESENT ILLNESS
[FreeTextEntry1] : follow up visit with the patient via tele-video using [employer]), patient gives verbal consent. Patient is at remote location. Provider is at [address] [place] [address]\par Followup visit\par I have spoken to her on several occasions. This is regarding the headache. She went to see another neurologist and was given gabapentin which did not help. She then went back to nortriptyline. But it turns out once she stopped the prednisone the headaches resolved. She continues to use the injection weekly and wants to know how long she has to use it 4.\par \par She denies any other symptoms at this time. She does admit to aches and pains that respond to Tylenol or Motrin. She also complains of spasms in her head following intercourse.\par She continues to use the weekly injection. She rates her headache at a 0/10. Occasionally when she is stressed she feels some pressure in the left temple area. She denies any jaw pain. She denies visual symptoms. She denies fatigue. She denies stiffness in her shoulders or hips.\par \par She denies any recent infections except for the sinus infection. She denies fevers or chills or night sweats. She has a good appetite.

## 2020-05-01 NOTE — PHYSICAL EXAM
[General Appearance - Alert] : alert [General Appearance - Well Nourished] : well nourished [General Appearance - Well Developed] : well developed [Sclera] : the sclera and conjunctiva were normal [FreeTextEntry1] : FROM neck and shoulders [Oriented To Time, Place, And Person] : oriented to person, place, and time [Impaired Insight] : insight and judgment were intact [Affect] : the affect was normal

## 2020-05-01 NOTE — REVIEW OF SYSTEMS
[Fever] : no fever [Feeling Tired] : not feeling tired [Eyesight Problems] : no eyesight problems [Negative] : Heme/Lymph

## 2020-05-01 NOTE — ASSESSMENT
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 \par \par Temporal arteritis-\par -despite having a negative biopsy my suspicion for temporal arteritis is moderate to high, she was symptomatic for about 4 weeks prior to biopsy and had been on steroids for 3 weeks so the biopsy may not be truly diagnostic. \par - to continue weekly actemra\par -she is now off steroids and the headaches have resolved\par -To switch injection to q.10 days for the next 2 injections to be followed by blood work and then will change to every 2 weeks.\par \par Cephalgia-\par Resolved at this time, may have improved with nortriptyline which she has stopped.\par \par Arthralgias-\par -Recommend increasing fluid intake\par -to use Tylenol or over-the-counter NSAIDs p.r.n.\par \par Followup 8 weeks. She is aware to call if her symptoms worsen\par

## 2020-05-06 ENCOUNTER — APPOINTMENT (OUTPATIENT)
Dept: NEUROLOGY | Facility: CLINIC | Age: 50
End: 2020-05-06

## 2020-07-15 ENCOUNTER — APPOINTMENT (OUTPATIENT)
Dept: RHEUMATOLOGY | Facility: CLINIC | Age: 50
End: 2020-07-15
Payer: COMMERCIAL

## 2020-07-15 VITALS
OXYGEN SATURATION: 98 % | WEIGHT: 174 LBS | DIASTOLIC BLOOD PRESSURE: 70 MMHG | HEIGHT: 65 IN | HEART RATE: 90 BPM | SYSTOLIC BLOOD PRESSURE: 110 MMHG | BODY MASS INDEX: 28.99 KG/M2 | TEMPERATURE: 97 F

## 2020-07-15 DIAGNOSIS — B02.9 ZOSTER W/OUT COMPLICATIONS: ICD-10-CM

## 2020-07-15 PROCEDURE — 99214 OFFICE O/P EST MOD 30 MIN: CPT

## 2020-07-15 RX ORDER — ZOSTER VACCINE RECOMBINANT, ADJUVANTED 50 MCG/0.5
50 KIT INTRAMUSCULAR
Qty: 1 | Refills: 1 | Status: ACTIVE | COMMUNITY
Start: 2020-07-15 | End: 1900-01-01

## 2020-07-15 RX ORDER — GABAPENTIN 100 MG/1
100 CAPSULE ORAL
Qty: 90 | Refills: 3 | Status: ACTIVE | COMMUNITY
Start: 2020-07-15 | End: 1900-01-01

## 2020-07-16 NOTE — HISTORY OF PRESENT ILLNESS
[FreeTextEntry1] : Followup visit\par she is feeling much better. She is Now off steroids. Her last injection was 3 weeks ago. I have spoken to her on several occasions. Her blood work has been stable. She did call me if the shingles outbreak on her upper lip, we held the injection at that time, currently it has been 3 weeks that she has been without it and she feels great. She wants to know she can stop using it.\par \par We have been slowly weaning down on the injection.\par \par She rates her headache at a 0/10. Occasionally when she is stressed she feels some pressure in the left temple area. She denies any jaw pain. She denies visual symptoms. She denies fatigue. She denies stiffness in her shoulders or hips.\par \par She denies any recent infections except for the Shingles, it was very painful, she is on gabapentin at this time.  She denies fevers or chills or night sweats. She has a good appetite.

## 2020-07-16 NOTE — ASSESSMENT
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 At this time my suspicion for giant cell arteritis is moderate, the only atypical features are her age and race. \par \par Temporal arteritis-\par -despite having a negative biopsy my suspicion for temporal arteritis is moderate to high, she was symptomatic for about 4 weeks prior to biopsy and had been on steroids for 3 weeks so the biopsy may not be truly diagnostic. \par - Since her last visit she weaned off prednisone in February.\par -we have spoken and she was using actemra every week followed by a taper, she is now on it every 3 weeks.\par -She had a severe outbreak of shingles on her face, she stopped using it, she is now nervous about using it and wants to know she can discontinue it.\par -Clinically she is dramatically better, at this time there is nothing to suggest recurrence or active temporal arteritis.\par -Will repeat labs\par -Continue to hold her injection\par \par \par Shingles-\par She had shingles outbreak on her face\par -Still with some scar tissue\par -She is on gabapentin for pain relief\par -She should receive shingrix vaccine\par Followup 12 weeks. She is aware to call if her symptoms worsen\par

## 2020-07-16 NOTE — REVIEW OF SYSTEMS
[Feeling Tired] : feeling tired [Fever] : fever [Eyesight Problems] : eyesight problems [Negative] : Heme/Lymph

## 2020-07-16 NOTE — PHYSICAL EXAM
[General Appearance - Alert] : alert [General Appearance - Well Nourished] : well nourished [General Appearance - Well Developed] : well developed [Sclera] : the sclera and conjunctiva were normal [Oropharynx] : the oropharynx was normal [Neck Appearance] : the appearance of the neck was normal [Respiration, Rhythm And Depth] : normal respiratory rhythm and effort [Auscultation Breath Sounds / Voice Sounds] : lungs were clear to auscultation bilaterally [Heart Sounds] : normal S1 and S2 [Full Pulse] : the pedal pulses are present [Edema] : there was no peripheral edema [Abdomen Tenderness] : non-tender [Supraclavicular Lymph Nodes Enlarged Bilaterally] : supraclavicular [Cervical Lymph Nodes Enlarged Anterior Bilaterally] : anterior cervical [No Spinal Tenderness] : no spinal tenderness [Abnormal Walk] : normal gait [Musculoskeletal - Swelling] : no joint swelling seen [Nail Clubbing] : no clubbing  or cyanosis of the fingernails [Motor Tone] : muscle strength and tone were normal [FreeTextEntry1] : FROM neck, shoulders, elbows, wrists, hands, hips, knees, ankles and feet, including the small joints of the hands and feet without any evidence of inflammatory arthritis  [] : no rash [Motor Exam] : the motor exam was normal [Deep Tendon Reflexes (DTR)] : deep tendon reflexes were 2+ and symmetric [Oriented To Time, Place, And Person] : oriented to person, place, and time [Impaired Insight] : insight and judgment were intact [Affect] : the affect was normal

## 2020-11-09 LAB
ALBUMIN SERPL ELPH-MCNC: 4.1 G/DL
ALP BLD-CCNC: 72 U/L
ALT SERPL-CCNC: 12 U/L
ANION GAP SERPL CALC-SCNC: 13 MMOL/L
AST SERPL-CCNC: 14 U/L
BASOPHILS # BLD AUTO: 0.02 K/UL
BASOPHILS NFR BLD AUTO: 0.3 %
BILIRUB SERPL-MCNC: 0.3 MG/DL
BUN SERPL-MCNC: 8 MG/DL
CALCIUM SERPL-MCNC: 9.7 MG/DL
CHLORIDE SERPL-SCNC: 103 MMOL/L
CO2 SERPL-SCNC: 26 MMOL/L
CREAT SERPL-MCNC: 0.7 MG/DL
CRP SERPL-MCNC: 1.21 MG/DL
EOSINOPHIL # BLD AUTO: 0.11 K/UL
EOSINOPHIL NFR BLD AUTO: 1.5 %
ERYTHROCYTE [SEDIMENTATION RATE] IN BLOOD BY WESTERGREN METHOD: 73 MM/HR
GLUCOSE SERPL-MCNC: 106 MG/DL
HCT VFR BLD CALC: 44.6 %
HGB BLD-MCNC: 13.5 G/DL
IMM GRANULOCYTES NFR BLD AUTO: 0.1 %
LYMPHOCYTES # BLD AUTO: 1.54 K/UL
LYMPHOCYTES NFR BLD AUTO: 21.6 %
MAN DIFF?: NORMAL
MCHC RBC-ENTMCNC: 28 PG
MCHC RBC-ENTMCNC: 30.3 GM/DL
MCV RBC AUTO: 92.5 FL
MONOCYTES # BLD AUTO: 0.53 K/UL
MONOCYTES NFR BLD AUTO: 7.4 %
NEUTROPHILS # BLD AUTO: 4.91 K/UL
NEUTROPHILS NFR BLD AUTO: 69.1 %
PLATELET # BLD AUTO: 249 K/UL
POTASSIUM SERPL-SCNC: 4.9 MMOL/L
PROT SERPL-MCNC: 7.3 G/DL
RBC # BLD: 4.82 M/UL
RBC # FLD: 12.9 %
RHEUMATOID FACT SER QL: <10 IU/ML
SODIUM SERPL-SCNC: 142 MMOL/L
WBC # FLD AUTO: 7.12 K/UL

## 2020-11-10 LAB
CCP AB SER IA-ACNC: <8 UNITS
RF+CCP IGG SER-IMP: NEGATIVE

## 2020-11-11 ENCOUNTER — APPOINTMENT (OUTPATIENT)
Dept: RHEUMATOLOGY | Facility: CLINIC | Age: 50
End: 2020-11-11
Payer: COMMERCIAL

## 2020-11-11 VITALS
BODY MASS INDEX: 27.32 KG/M2 | WEIGHT: 164 LBS | TEMPERATURE: 96.8 F | SYSTOLIC BLOOD PRESSURE: 114 MMHG | OXYGEN SATURATION: 97 % | DIASTOLIC BLOOD PRESSURE: 79 MMHG | HEIGHT: 65 IN | HEART RATE: 86 BPM

## 2020-11-11 LAB
ALBUMIN MFR SERPL ELPH: 50 %
ALBUMIN SERPL-MCNC: 3.6 G/DL
ALBUMIN/GLOB SERPL: 1 RATIO
ALPHA1 GLOB MFR SERPL ELPH: 4.9 %
ALPHA1 GLOB SERPL ELPH-MCNC: 0.4 G/DL
ALPHA2 GLOB MFR SERPL ELPH: 13.1 %
ALPHA2 GLOB SERPL ELPH-MCNC: 1 G/DL
B-GLOBULIN MFR SERPL ELPH: 13.1 %
B-GLOBULIN SERPL ELPH-MCNC: 1 G/DL
DEPRECATED KAPPA LC FREE/LAMBDA SER: 1.27 RATIO
GAMMA GLOB FLD ELPH-MCNC: 1.4 G/DL
GAMMA GLOB MFR SERPL ELPH: 18.9 %
IGA SER QL IEP: 399 MG/DL
IGG SER QL IEP: 1293 MG/DL
IGM SER QL IEP: 39 MG/DL
INTERPRETATION SERPL IEP-IMP: NORMAL
KAPPA LC CSF-MCNC: 1.86 MG/DL
KAPPA LC SERPL-MCNC: 2.36 MG/DL
M PROTEIN SPEC IFE-MCNC: NORMAL
PROT SERPL-MCNC: 7.3 G/DL
PROT SERPL-MCNC: 7.3 G/DL

## 2020-11-11 PROCEDURE — 99215 OFFICE O/P EST HI 40 MIN: CPT | Mod: 25

## 2020-11-11 PROCEDURE — 99072 ADDL SUPL MATRL&STAF TM PHE: CPT

## 2020-11-12 NOTE — HISTORY OF PRESENT ILLNESS
[FreeTextEntry1] : Urgent visit-\par Patient called over the weekend called stating that she does not feel well, she has symptoms of inflammation. It turns out that for the last 4 weeks she has not been feeling well. About 4 weeks ago she started to notice some blurry vision and headaches, she did not think much of it is related to computer work. About 2 weeks ago she went to see her primary care physician was told that she has symptoms of inflammation. The initial visual symptoms resolved. Now she complains of pain and stiffness in her shoulders and her hips almost her entire body hurts. She complains of headaches visual symptoms have resolved. She rates her symptoms at 8/10. She went for blood work and her sedimentation rate is in the 70s.\par \par She continues to use of gabapentin. She has also been dealing with low-grade fevers, round and 99 almost daily for the last 2 weeks. He said the initial symptoms that she had when she was diagnosed with giant cell arteritis. She did have a virus test done which was negative.

## 2020-11-12 NOTE — ASSESSMENT
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 At this time my suspicion for giant cell arteritis is moderate, the only atypical features are her age and race. \par She some of the recurrence of symptoms.\par \par Temporal arteritis-\par -despite having a negative biopsy my suspicion for temporal arteritis is moderate to high, she was symptomatic for about 4 weeks prior to biopsy and had been on steroids for 3 weeks so the biopsy may not be truly diagnostic. \par -To start oral prednisone 40 mg daily\par -Resume actemra\par \par Steroid use\par Risks and benefits of steroids were d/w patient including but not limited to weight gain, diabetes, HTN, avascular necrosis, osteoporosis, glaucoma, cataract, infections and immunosuppression.\par \par Followup 4 weeks. She is aware to call if her symptoms worsen\par

## 2020-12-01 ENCOUNTER — TRANSCRIPTION ENCOUNTER (OUTPATIENT)
Age: 50
End: 2020-12-01

## 2020-12-04 LAB
CRP SERPL-MCNC: <0.1 MG/DL
ERYTHROCYTE [SEDIMENTATION RATE] IN BLOOD BY WESTERGREN METHOD: 6 MM/HR

## 2020-12-06 LAB
HAV IGM SER QL: NONREACTIVE
HBV CORE IGM SER QL: NONREACTIVE
HBV SURFACE AG SER QL: NONREACTIVE
HCV AB SER QL: NONREACTIVE
HCV S/CO RATIO: 0.27 S/CO
M TB IFN-G BLD-IMP: NEGATIVE
QUANTIFERON TB PLUS MITOGEN MINUS NIL: 9.34 IU/ML
QUANTIFERON TB PLUS NIL: 0.01 IU/ML
QUANTIFERON TB PLUS TB1 MINUS NIL: 0.01 IU/ML
QUANTIFERON TB PLUS TB2 MINUS NIL: 0.01 IU/ML

## 2020-12-07 ENCOUNTER — APPOINTMENT (OUTPATIENT)
Dept: RHEUMATOLOGY | Facility: CLINIC | Age: 50
End: 2020-12-07

## 2020-12-11 ENCOUNTER — APPOINTMENT (OUTPATIENT)
Dept: RHEUMATOLOGY | Facility: CLINIC | Age: 50
End: 2020-12-11
Payer: COMMERCIAL

## 2020-12-11 PROCEDURE — 99213 OFFICE O/P EST LOW 20 MIN: CPT | Mod: 95

## 2020-12-12 NOTE — ASSESSMENT
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 At this time my suspicion for giant cell arteritis is moderate, the only atypical features are her age and race. \par She some of the recurrence of symptoms.\par \par Temporal arteritis-\par -despite having a negative biopsy my suspicion for temporal arteritis is moderate to high, she was symptomatic for about 4 weeks prior to biopsy and had been on steroids for 3 weeks so the biopsy may not be truly diagnostic. \par -She had a recent recurrence of symptoms\par -Lower prednisone to 30 mg for 2 weeks\par -Continue weekly actemra injection\par -Recent sedimentation rate was normal\par \par Steroid use\par Risks and benefits of steroids were d/w patient including but not limited to weight gain, diabetes, HTN, avascular necrosis, osteoporosis, glaucoma, cataract, infections and immunosuppression.\par \par Headaches-\par -To return to neurology\par -Also complains of ringing in the ears and sensitivity to sound,? Migraine, also to see ENT if neurological workup is negative\par \par Followup 4 weeks. She is aware to call if her symptoms worsen\par

## 2020-12-12 NOTE — PHYSICAL EXAM
[General Appearance - Alert] : alert [General Appearance - Well Nourished] : well nourished [General Appearance - Well Developed] : well developed [Sclera] : the sclera and conjunctiva were normal [Oriented To Time, Place, And Person] : oriented to person, place, and time [Impaired Insight] : insight and judgment were intact [Affect] : the affect was normal

## 2020-12-12 NOTE — HISTORY OF PRESENT ILLNESS
[FreeTextEntry1] : follow up visit with the patient via tele-video using [employer], patient gives verbal consent. Patient is at remote location in [place]. Provider is at [address] [place] [address]\par Patient is now using prednisone 40 mg. She is using weekly injection as well. Her recent sedimentation rate was normal. She continues to deal with headaches and sinus pressure and ringing in her ears. She would  have an appointment to see neurology. Her visual symptoms have resolved. Her jaw pain has resolved. Her classic GCA symptoms have resolved. She has side effects from steroids and wants to know how she can taper further.\par She rates her sx at a 5/10

## 2021-01-18 LAB
CRP SERPL-MCNC: <0.1 MG/DL
ERYTHROCYTE [SEDIMENTATION RATE] IN BLOOD BY WESTERGREN METHOD: 7 MM/HR

## 2021-01-18 RX ORDER — PREDNISONE 5 MG/1
5 TABLET ORAL DAILY
Qty: 30 | Refills: 2 | Status: ACTIVE | COMMUNITY
Start: 2021-01-18 | End: 1900-01-01

## 2021-01-21 ENCOUNTER — RX RENEWAL (OUTPATIENT)
Age: 51
End: 2021-01-21

## 2021-02-15 ENCOUNTER — NON-APPOINTMENT (OUTPATIENT)
Age: 51
End: 2021-02-15

## 2021-02-15 LAB
ALBUMIN SERPL ELPH-MCNC: 4.4 G/DL
ALP BLD-CCNC: 47 U/L
ALT SERPL-CCNC: 23 U/L
ANION GAP SERPL CALC-SCNC: 13 MMOL/L
AST SERPL-CCNC: 17 U/L
BASOPHILS # BLD AUTO: 0.03 K/UL
BASOPHILS NFR BLD AUTO: 0.5 %
BILIRUB SERPL-MCNC: 0.4 MG/DL
BUN SERPL-MCNC: 12 MG/DL
CALCIUM SERPL-MCNC: 10 MG/DL
CHLORIDE SERPL-SCNC: 104 MMOL/L
CHOLEST SERPL-MCNC: 318 MG/DL
CO2 SERPL-SCNC: 26 MMOL/L
CREAT SERPL-MCNC: 0.81 MG/DL
CRP SERPL-MCNC: <0.1 MG/DL
EOSINOPHIL # BLD AUTO: 0.09 K/UL
EOSINOPHIL NFR BLD AUTO: 1.6 %
ERYTHROCYTE [SEDIMENTATION RATE] IN BLOOD BY WESTERGREN METHOD: 12 MM/HR
GLUCOSE SERPL-MCNC: 90 MG/DL
HCT VFR BLD CALC: 49.6 %
HDLC SERPL-MCNC: 70 MG/DL
HGB BLD-MCNC: 15.2 G/DL
IMM GRANULOCYTES NFR BLD AUTO: 0.7 %
LDLC SERPL CALC-MCNC: 213 MG/DL
LYMPHOCYTES # BLD AUTO: 2.13 K/UL
LYMPHOCYTES NFR BLD AUTO: 39 %
MAN DIFF?: NORMAL
MCHC RBC-ENTMCNC: 29 PG
MCHC RBC-ENTMCNC: 30.6 GM/DL
MCV RBC AUTO: 94.5 FL
MONOCYTES # BLD AUTO: 0.4 K/UL
MONOCYTES NFR BLD AUTO: 7.3 %
NEUTROPHILS # BLD AUTO: 2.77 K/UL
NEUTROPHILS NFR BLD AUTO: 50.9 %
NONHDLC SERPL-MCNC: 248 MG/DL
PLATELET # BLD AUTO: 173 K/UL
POTASSIUM SERPL-SCNC: 5 MMOL/L
PROT SERPL-MCNC: 6.9 G/DL
RBC # BLD: 5.25 M/UL
RBC # FLD: 15.6 %
SODIUM SERPL-SCNC: 143 MMOL/L
TRIGL SERPL-MCNC: 176 MG/DL
WBC # FLD AUTO: 5.46 K/UL

## 2021-02-19 ENCOUNTER — APPOINTMENT (OUTPATIENT)
Dept: RHEUMATOLOGY | Facility: CLINIC | Age: 51
End: 2021-02-19
Payer: MEDICAID

## 2021-02-19 DIAGNOSIS — D84.9 IMMUNODEFICIENCY, UNSPECIFIED: ICD-10-CM

## 2021-02-19 DIAGNOSIS — Z79.52 LONG TERM (CURRENT) USE OF SYSTEMIC STEROIDS: ICD-10-CM

## 2021-02-19 DIAGNOSIS — M31.6 OTHER GIANT CELL ARTERITIS: ICD-10-CM

## 2021-02-19 PROCEDURE — 99213 OFFICE O/P EST LOW 20 MIN: CPT | Mod: 95

## 2021-02-19 RX ORDER — TOCILIZUMAB 180 MG/ML
162 INJECTION, SOLUTION SUBCUTANEOUS
Qty: 4 | Refills: 3 | Status: ACTIVE | COMMUNITY
Start: 2019-12-06 | End: 1900-01-01

## 2021-02-19 RX ORDER — PREDNISONE 10 MG/1
10 TABLET ORAL TWICE DAILY
Qty: 60 | Refills: 0 | Status: DISCONTINUED | COMMUNITY
Start: 2020-11-11 | End: 2021-02-19

## 2021-02-19 NOTE — REASON FOR VISIT
[Follow-Up: _____] : a [unfilled] follow-up visit [FreeTextEntry1] : [facility]

## 2021-02-19 NOTE — HISTORY OF PRESENT ILLNESS
[FreeTextEntry1] : follow up visit with the patient via tele-video using [employer], patient gives verbal consent. Patient is at remote location in [place]. Provider is at [address] [place] [address]\par Patient is now using prednisone 5 mg. She is using weekly injection as well. Her recent sedimentation rate was normal. She continues to deal with headaches and sinus pressure and ringing in her ears. \par the symptoms respond to Excedrin use.\par she has jaw pain, she has an appointment to see the dentist, it bothers her when she eats or talks, she denies clicking or locking in her jaw. She has gained weight and is concerned about the stretching of the skin on her face. Her classic GCA symptoms have resolved. She has side effects from steroids and wants to know how she can taper further.\par She rates her sx at a 1/10\par Her lipid panel was elevated, she has brought this to her primary care physician's attention.

## 2021-02-19 NOTE — ASSESSMENT
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 At this time my suspicion for giant cell arteritis is moderate, the only atypical features are her age and race. \par She some of the recurrence of symptoms.\par \par Temporal arteritis-\par -despite having a negative biopsy my suspicion for temporal arteritis is moderate to high, she was symptomatic for about 4 weeks prior to biopsy and had been on steroids for 3 weeks so the biopsy may not be truly diagnostic. \par -She had a recent recurrence of symptoms\par -She is now on prednisone 5 mg load every other day and discontinue in 10 days\par -Continue weekly actemra injection\par -Recent sedimentation rate was normal\par \par Steroid use\par Risks and benefits of steroids were d/w patient including but not limited to weight gain, diabetes, HTN, avascular necrosis, osteoporosis, glaucoma, cataract, infections and immunosuppression.\par \par Headaches-\par -To return to neurology\par -Headaches respond to Excedrin, she is now complaining of jaw pain, does not appear to be classic for chocolate occasional, recommend dental input she may need a mouthguard may be related to stress and clenching her jaw at night\par \par Followup 4 weeks. She is aware to call if her symptoms worsen\par

## 2021-03-22 LAB
CRP SERPL-MCNC: <3 MG/L
ERYTHROCYTE [SEDIMENTATION RATE] IN BLOOD BY WESTERGREN METHOD: 9 MM/HR

## 2021-04-22 ENCOUNTER — APPOINTMENT (OUTPATIENT)
Dept: RHEUMATOLOGY | Facility: CLINIC | Age: 51
End: 2021-04-22

## 2021-11-30 NOTE — REVIEW OF SYSTEMS
Scheduled for Monday SDA [As Noted in HPI] : as noted in HPI [Abnormal Sensation] : an abnormal sensation [Fever] : fever [Tension Headache] : tension-type headaches [Hypersensitivity] : hypersensitivity [Negative] : Heme/Lymph

## 2021-12-23 ENCOUNTER — TRANSCRIPTION ENCOUNTER (OUTPATIENT)
Age: 51
End: 2021-12-23

## 2022-12-21 ENCOUNTER — EMERGENCY (EMERGENCY)
Facility: HOSPITAL | Age: 52
LOS: 1 days | Discharge: HOME | End: 2022-12-21
Attending: EMERGENCY MEDICINE
Payer: COMMERCIAL

## 2022-12-21 VITALS
HEART RATE: 77 BPM | OXYGEN SATURATION: 97 % | DIASTOLIC BLOOD PRESSURE: 95 MMHG | SYSTOLIC BLOOD PRESSURE: 140 MMHG | HEIGHT: 65 IN | TEMPERATURE: 97 F | RESPIRATION RATE: 18 BRPM | WEIGHT: 160.06 LBS

## 2022-12-21 DIAGNOSIS — Z90.710 ACQUIRED ABSENCE OF BOTH CERVIX AND UTERUS: Chronic | ICD-10-CM

## 2022-12-21 PROCEDURE — 99053 MED SERV 10PM-8AM 24 HR FAC: CPT

## 2022-12-21 PROCEDURE — 99284 EMERGENCY DEPT VISIT MOD MDM: CPT
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
## 2022-12-22 PROCEDURE — 99282 EMERGENCY DEPT VISIT SF MDM: CPT

## 2022-12-22 RX ORDER — ACETAMINOPHEN 500 MG
650 TABLET ORAL ONCE
Refills: 0 | Status: COMPLETED | OUTPATIENT
Start: 2022-12-22 | End: 2022-12-22

## 2022-12-22 RX ADMIN — Medication 650 MILLIGRAM(S): at 00:51

## 2022-12-22 NOTE — ED PROVIDER NOTE - PATIENT PORTAL LINK FT
You can access the FollowMyHealth Patient Portal offered by [facility] by registering at the following website: [email or web address]. By joining [employer]’s FollowMyHealth portal, you will also be able to view your health information using other applications (apps) compatible with our system.
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
## 2022-12-22 NOTE — ED PROVIDER NOTE - NEUROLOGICAL, MLM
neuro: CN II - XII intact, EOMI, PERRL, no papilledema, 5/5 muscle strength x 4 extremities, no sensory deficits, 2+ dtr globally, negative babinski, no ataxic gait, normal ZUHAIR and FNT, normal romberg
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
## 2022-12-27 ENCOUNTER — OFFICE (OUTPATIENT)
Dept: URBAN - METROPOLITAN AREA CLINIC 6 | Facility: CLINIC | Age: 52
Setting detail: OPHTHALMOLOGY
End: 2022-12-27
Payer: MEDICAID

## 2022-12-27 DIAGNOSIS — H25.13: ICD-10-CM

## 2022-12-27 DIAGNOSIS — H11.32: ICD-10-CM

## 2022-12-27 DIAGNOSIS — S05.12XA: ICD-10-CM

## 2022-12-27 DIAGNOSIS — H43.393: ICD-10-CM

## 2022-12-27 PROBLEM — H16.223 DRY EYE SYNDROME K SICCA;  ,, BOTH EYES: Status: ACTIVE | Noted: 2022-12-27

## 2022-12-27 PROCEDURE — 99213 OFFICE O/P EST LOW 20 MIN: CPT | Performed by: OPHTHALMOLOGY

## 2022-12-27 ASSESSMENT — REFRACTION_MANIFEST
OD_SPHERE: -2.25
OS_SPHERE: -3.00
OD_AXIS: 010
OD_SPHERE: -2.25
OD_ADD: +2.25
OS_VA1: 20/20
OS_VA1: 20/20
OS_AXIS: 180
OS_SPHERE: -3.00
OD_CYLINDER: -2.25
OS_AXIS: 180
OD_VA1: 20/20
OS_ADD: +2.25
OD_VA1: 20/20
OS_CYLINDER: -2.00
OD_CYLINDER: -2.25
OD_AXIS: 010
OS_CYLINDER: -2.00

## 2022-12-27 ASSESSMENT — REFRACTION_CURRENTRX
OS_AXIS: 003
OD_VPRISM_DIRECTION: PROGS
OS_CYLINDER: -2.00
OD_AXIS: 015
OS_ADD: +2.25
OD_CYLINDER: -2.25
OD_SPHERE: -2.75
OD_OVR_VA: 20/
OS_OVR_VA: 20/
OD_ADD: +2.25
OS_SPHERE: -3.25
OS_VPRISM_DIRECTION: PROGS

## 2022-12-27 ASSESSMENT — REFRACTION_AUTOREFRACTION
OD_CYLINDER: -2.25
OD_SPHERE: -2.25
OD_AXIS: 007
OS_AXIS: 001
OS_SPHERE: -3.00
OS_CYLINDER: -1.50

## 2022-12-27 ASSESSMENT — LID EXAM ASSESSMENTS
OD_BLEPHARITIS: RUL T 1+
OS_BLEPHARITIS: LUL T 1+

## 2022-12-27 ASSESSMENT — AXIALLENGTH_DERIVED
OS_AL: 24.4455
OS_AL: 24.4455
OD_AL: 24.4306
OD_AL: 24.4306
OS_AL: 24.3414
OD_AL: 24.4306

## 2022-12-27 ASSESSMENT — KERATOMETRY
OD_K2POWER_DIOPTERS: 45.75
OD_K1POWER_DIOPTERS: 44.00
OS_K1POWER_DIOPTERS: 44.75
OS_AXISANGLE_DEGREES: 091
OS_K2POWER_DIOPTERS: 46.25
OD_AXISANGLE_DEGREES: 093

## 2022-12-27 ASSESSMENT — SPHEQUIV_DERIVED
OD_SPHEQUIV: -3.375
OS_SPHEQUIV: -4
OS_SPHEQUIV: -3.75
OD_SPHEQUIV: -3.375
OS_SPHEQUIV: -4
OD_SPHEQUIV: -3.375

## 2022-12-27 ASSESSMENT — TONOMETRY
OD_IOP_MMHG: 17
OS_IOP_MMHG: 15

## 2022-12-27 ASSESSMENT — VISUAL ACUITY
OD_BCVA: 20/20
OS_BCVA: 20/20

## 2022-12-27 ASSESSMENT — SUPERFICIAL PUNCTATE KERATITIS (SPK)
OS_SPK: T
OD_SPK: T

## 2022-12-27 ASSESSMENT — CONFRONTATIONAL VISUAL FIELD TEST (CVF)
OS_FINDINGS: FULL
OD_FINDINGS: FULL

## 2023-05-01 ENCOUNTER — OFFICE (OUTPATIENT)
Dept: URBAN - METROPOLITAN AREA CLINIC 104 | Facility: CLINIC | Age: 53
Setting detail: OPHTHALMOLOGY
End: 2023-05-01
Payer: MEDICAID

## 2023-05-01 DIAGNOSIS — H16.223: ICD-10-CM

## 2023-05-01 DIAGNOSIS — H11.31: ICD-10-CM

## 2023-05-01 DIAGNOSIS — H43.393: ICD-10-CM

## 2023-05-01 DIAGNOSIS — H01.004: ICD-10-CM

## 2023-05-01 DIAGNOSIS — H25.13: ICD-10-CM

## 2023-05-01 DIAGNOSIS — H01.001: ICD-10-CM

## 2023-05-01 DIAGNOSIS — S05.12XA: ICD-10-CM

## 2023-05-01 PROCEDURE — 99213 OFFICE O/P EST LOW 20 MIN: CPT | Performed by: OPHTHALMOLOGY

## 2023-05-01 ASSESSMENT — VISUAL ACUITY
OD_BCVA: 20/20
OS_BCVA: 20/20

## 2023-05-01 ASSESSMENT — SPHEQUIV_DERIVED
OD_SPHEQUIV: -3.375
OS_SPHEQUIV: -4
OS_SPHEQUIV: -4
OD_SPHEQUIV: -3.375
OD_SPHEQUIV: -3.375
OS_SPHEQUIV: -3.75

## 2023-05-01 ASSESSMENT — AXIALLENGTH_DERIVED
OD_AL: 24.4306
OS_AL: 24.3414
OS_AL: 24.4455
OD_AL: 24.4306
OS_AL: 24.4455
OD_AL: 24.4306

## 2023-05-01 ASSESSMENT — REFRACTION_AUTOREFRACTION
OD_CYLINDER: -2.25
OD_SPHERE: -2.25
OS_AXIS: 001
OD_AXIS: 007
OS_SPHERE: -3.00
OS_CYLINDER: -1.50

## 2023-05-01 ASSESSMENT — LID EXAM ASSESSMENTS
OD_BLEPHARITIS: RUL T 1+
OS_BLEPHARITIS: LUL T 1+

## 2023-05-01 ASSESSMENT — TONOMETRY
OS_IOP_MMHG: 10
OD_IOP_MMHG: 10

## 2023-05-01 ASSESSMENT — REFRACTION_MANIFEST
OD_ADD: +2.25
OS_SPHERE: -3.00
OD_AXIS: 010
OD_VA1: 20/20
OD_VA1: 20/20
OS_VA1: 20/20
OD_CYLINDER: -2.25
OS_CYLINDER: -2.00
OD_AXIS: 010
OS_CYLINDER: -2.00
OD_SPHERE: -2.25
OD_SPHERE: -2.25
OS_AXIS: 180
OS_VA1: 20/20
OS_SPHERE: -3.00
OS_ADD: +2.25
OD_CYLINDER: -2.25
OS_AXIS: 180

## 2023-05-01 ASSESSMENT — KERATOMETRY
OD_K2POWER_DIOPTERS: 45.75
OD_AXISANGLE_DEGREES: 093
OS_K2POWER_DIOPTERS: 46.25
OD_K1POWER_DIOPTERS: 44.00
OS_K1POWER_DIOPTERS: 44.75
OS_AXISANGLE_DEGREES: 091

## 2023-05-01 ASSESSMENT — REFRACTION_CURRENTRX
OS_SPHERE: -3.25
OS_OVR_VA: 20/
OD_SPHERE: -2.75
OS_ADD: +2.25
OD_ADD: +2.25
OD_VPRISM_DIRECTION: PROGS
OS_CYLINDER: -2.00
OD_AXIS: 015
OS_VPRISM_DIRECTION: PROGS
OD_OVR_VA: 20/
OS_AXIS: 003
OD_CYLINDER: -2.25

## 2023-05-01 ASSESSMENT — SUPERFICIAL PUNCTATE KERATITIS (SPK)
OS_SPK: T
OD_SPK: T

## 2023-05-01 ASSESSMENT — CONFRONTATIONAL VISUAL FIELD TEST (CVF)
OD_FINDINGS: FULL
OS_FINDINGS: FULL

## 2023-05-22 ENCOUNTER — OFFICE (OUTPATIENT)
Dept: URBAN - METROPOLITAN AREA CLINIC 104 | Facility: CLINIC | Age: 53
Setting detail: OPHTHALMOLOGY
End: 2023-05-22
Payer: MEDICAID

## 2023-05-22 DIAGNOSIS — H01.001: ICD-10-CM

## 2023-05-22 DIAGNOSIS — H16.223: ICD-10-CM

## 2023-05-22 DIAGNOSIS — H25.813: ICD-10-CM

## 2023-05-22 DIAGNOSIS — H01.004: ICD-10-CM

## 2023-05-22 DIAGNOSIS — H43.393: ICD-10-CM

## 2023-05-22 DIAGNOSIS — H25.13: ICD-10-CM

## 2023-05-22 PROBLEM — H11.31 SUBCONJUNCTIVAL HEMORRHAGE; RIGHT EYE: Status: RESOLVED | Noted: 2023-05-01 | Resolved: 2023-05-22

## 2023-05-22 PROCEDURE — 92014 COMPRE OPH EXAM EST PT 1/>: CPT | Performed by: OPTOMETRIST

## 2023-05-22 ASSESSMENT — REFRACTION_MANIFEST
OS_SPHERE: -3.00
OS_CYLINDER: -2.00
OD_SPHERE: -2.25
OD_ADD: +2.25
OS_VA1: 20/20
OS_ADD: +2.25
OS_CYLINDER: -2.00
OD_CYLINDER: -2.25
OS_SPHERE: -3.00
OD_VA1: 20/20
OD_SPHERE: -2.25
OS_AXIS: 180
OD_AXIS: 010
OD_CYLINDER: -2.25
OD_AXIS: 010
OS_VA1: 20/20
OS_AXIS: 180
OD_VA1: 20/20

## 2023-05-22 ASSESSMENT — AXIALLENGTH_DERIVED
OS_AL: 24.3102
OS_AL: 24.2072
OD_AL: 24.3932
OD_AL: 24.3932
OS_AL: 24.3102
OD_AL: 24.3932

## 2023-05-22 ASSESSMENT — TONOMETRY
OD_IOP_MMHG: 14
OS_IOP_MMHG: 14

## 2023-05-22 ASSESSMENT — SPHEQUIV_DERIVED
OS_SPHEQUIV: -4
OD_SPHEQUIV: -3.375
OS_SPHEQUIV: -4
OS_SPHEQUIV: -3.75
OD_SPHEQUIV: -3.375
OD_SPHEQUIV: -3.375

## 2023-05-22 ASSESSMENT — REFRACTION_CURRENTRX
OD_VPRISM_DIRECTION: PROGS
OD_AXIS: 015
OS_CYLINDER: -2.00
OS_AXIS: 003
OS_ADD: +2.25
OS_SPHERE: -3.25
OD_ADD: +2.25
OD_CYLINDER: -2.25
OD_OVR_VA: 20/
OD_SPHERE: -2.75
OS_VPRISM_DIRECTION: PROGS
OS_OVR_VA: 20/

## 2023-05-22 ASSESSMENT — REFRACTION_AUTOREFRACTION
OS_CYLINDER: -1.50
OD_CYLINDER: -2.25
OD_AXIS: 007
OS_SPHERE: -3.00
OS_AXIS: 001
OD_SPHERE: -2.25

## 2023-05-22 ASSESSMENT — LID EXAM ASSESSMENTS
OD_BLEPHARITIS: RUL T 1+
OS_BLEPHARITIS: LUL T 1+

## 2023-05-22 ASSESSMENT — KERATOMETRY
OD_AXISANGLE_DEGREES: 095
OS_K2POWER_DIOPTERS: 47.40
OS_AXISANGLE_DEGREES: 091
OD_K1POWER_DIOPTERS: 43.77
OS_K1POWER_DIOPTERS: 44.29
OD_K2POWER_DIOPTERS: 46.17

## 2023-05-22 ASSESSMENT — SUPERFICIAL PUNCTATE KERATITIS (SPK)
OD_SPK: T
OS_SPK: T

## 2023-05-22 ASSESSMENT — VISUAL ACUITY
OD_BCVA: 20/20
OS_BCVA: 20/20

## 2023-05-22 ASSESSMENT — CONFRONTATIONAL VISUAL FIELD TEST (CVF)
OD_FINDINGS: FULL
OS_FINDINGS: FULL

## 2024-07-05 ENCOUNTER — NON-APPOINTMENT (OUTPATIENT)
Age: 54
End: 2024-07-05

## 2024-07-10 ENCOUNTER — NON-APPOINTMENT (OUTPATIENT)
Age: 54
End: 2024-07-10

## 2025-02-24 NOTE — ED ADULT TRIAGE NOTE - RESPIRATORY RATE (BREATHS/MIN)
Patient reports nightly compliance of at least 4 hours per night with auto PAP therapy.  Patient is using and benefitting from nightly auto PAP therapy, recommend continued auto PAP therapy at 5-20 cm H2O.    
18

## 2025-03-11 ENCOUNTER — OFFICE (OUTPATIENT)
Dept: URBAN - METROPOLITAN AREA CLINIC 6 | Facility: CLINIC | Age: 55
Setting detail: OPHTHALMOLOGY
End: 2025-03-11
Payer: MEDICAID

## 2025-03-11 DIAGNOSIS — H01.004: ICD-10-CM

## 2025-03-11 DIAGNOSIS — H25.13: ICD-10-CM

## 2025-03-11 DIAGNOSIS — H52.4: ICD-10-CM

## 2025-03-11 DIAGNOSIS — H01.001: ICD-10-CM

## 2025-03-11 DIAGNOSIS — H43.393: ICD-10-CM

## 2025-03-11 PROCEDURE — 92014 COMPRE OPH EXAM EST PT 1/>: CPT | Performed by: OPHTHALMOLOGY

## 2025-03-11 PROCEDURE — 92015 DETERMINE REFRACTIVE STATE: CPT | Performed by: OPHTHALMOLOGY

## 2025-03-11 ASSESSMENT — REFRACTION_AUTOREFRACTION
OD_AXIS: 014
OS_AXIS: 180
OD_SPHERE: -2.25
OD_CYLINDER: -2.00
OS_CYLINDER: -2.00
OS_SPHERE: -2.50

## 2025-03-11 ASSESSMENT — KERATOMETRY
OS_K2POWER_DIOPTERS: 46.75
OD_K2POWER_DIOPTERS: 46.00
OD_K1POWER_DIOPTERS: 43.75
OD_AXISANGLE_DEGREES: 096
OS_K1POWER_DIOPTERS: 44.75
OS_AXISANGLE_DEGREES: 091

## 2025-03-11 ASSESSMENT — REFRACTION_CURRENTRX
OD_ADD: +2.50
OS_SPHERE: -2.75
OD_AXIS: 021
OD_VPRISM_DIRECTION: PROGS
OS_AXIS: 171
OD_OVR_VA: 20/
OS_VPRISM_DIRECTION: PROGS
OS_CYLINDER: -1.75
OS_OVR_VA: 20/
OD_CYLINDER: -1.75
OS_ADD: +2.50
OD_SPHERE: -2.75

## 2025-03-11 ASSESSMENT — SUPERFICIAL PUNCTATE KERATITIS (SPK)
OS_SPK: T
OD_SPK: T

## 2025-03-11 ASSESSMENT — REFRACTION_MANIFEST
OU_VA: 20/20
OU_VA: 20/20
OS_VA1: 20/20
OS_CYLINDER: -1.75
OS_SPHERE: -2.75
OS_AXIS: 180
OS_AXIS: 180
OS_VA1: 20/20
OS_SPHERE: -2.75
OD_AXIS: 015
OS_ADD: +2.50
OS_CYLINDER: -1.75
OD_AXIS: 015
OD_VA1: 20/20
OD_VA1: 20/20
OD_SPHERE: -2.00
OD_SPHERE: -2.00
OD_ADD: +2.50
OD_CYLINDER: -1.75
OD_CYLINDER: -1.75

## 2025-03-11 ASSESSMENT — VISUAL ACUITY
OD_BCVA: 20/20
OS_BCVA: 20/20-2

## 2025-03-11 ASSESSMENT — CONFRONTATIONAL VISUAL FIELD TEST (CVF)
OD_FINDINGS: FULL
OS_FINDINGS: FULL

## 2025-03-11 ASSESSMENT — TONOMETRY
OD_IOP_MMHG: 14
OS_IOP_MMHG: 15

## 2025-03-11 ASSESSMENT — LID EXAM ASSESSMENTS
OD_BLEPHARITIS: RUL T 1+
OS_BLEPHARITIS: LUL T 1+

## 2025-09-17 ENCOUNTER — APPOINTMENT (OUTPATIENT)
Dept: FAMILY MEDICINE | Facility: CLINIC | Age: 55
End: 2025-09-17
Payer: COMMERCIAL

## 2025-09-17 VITALS
WEIGHT: 165.13 LBS | SYSTOLIC BLOOD PRESSURE: 108 MMHG | BODY MASS INDEX: 27.51 KG/M2 | DIASTOLIC BLOOD PRESSURE: 84 MMHG | HEART RATE: 82 BPM | HEIGHT: 65 IN

## 2025-09-17 DIAGNOSIS — H53.8 OTHER VISUAL DISTURBANCES: ICD-10-CM

## 2025-09-17 DIAGNOSIS — Z12.39 ENCOUNTER FOR OTHER SCREENING FOR MALIGNANT NEOPLASM OF BREAST: ICD-10-CM

## 2025-09-17 DIAGNOSIS — S73.191A OTHER SPRAIN OF RIGHT HIP, INITIAL ENCOUNTER: ICD-10-CM

## 2025-09-17 DIAGNOSIS — R32 UNSPECIFIED URINARY INCONTINENCE: ICD-10-CM

## 2025-09-17 DIAGNOSIS — Z79.899 OTHER LONG TERM (CURRENT) DRUG THERAPY: ICD-10-CM

## 2025-09-17 DIAGNOSIS — Z87.39 PERSONAL HISTORY OF OTHER DISEASES OF THE MUSCULOSKELETAL SYSTEM AND CONNECTIVE TISSUE: ICD-10-CM

## 2025-09-17 DIAGNOSIS — M79.7 FIBROMYALGIA: ICD-10-CM

## 2025-09-17 DIAGNOSIS — E78.2 MIXED HYPERLIPIDEMIA: ICD-10-CM

## 2025-09-17 DIAGNOSIS — Z86.19 PERSONAL HISTORY OF OTHER INFECTIOUS AND PARASITIC DISEASES: ICD-10-CM

## 2025-09-17 DIAGNOSIS — R73.01 IMPAIRED FASTING GLUCOSE: ICD-10-CM

## 2025-09-17 DIAGNOSIS — Z87.898 PERSONAL HISTORY OF OTHER SPECIFIED CONDITIONS: ICD-10-CM

## 2025-09-17 PROCEDURE — 99203 OFFICE O/P NEW LOW 30 MIN: CPT

## 2025-09-17 RX ORDER — ROSUVASTATIN CALCIUM 5 MG/1
5 TABLET, FILM COATED ORAL
Refills: 0 | Status: ACTIVE | COMMUNITY

## 2025-09-17 RX ORDER — GABAPENTIN 300 MG/1
300 CAPSULE ORAL TWICE DAILY
Qty: 180 | Refills: 1 | Status: ACTIVE | COMMUNITY
Start: 2025-09-17 | End: 1900-01-01

## 2025-09-17 RX ORDER — TAMSULOSIN HYDROCHLORIDE 0.4 MG/1
0.4 CAPSULE ORAL
Refills: 0 | Status: ACTIVE | COMMUNITY

## 2025-09-17 RX ORDER — TOLTERODINE TARTRATE 4 MG/1
4 CAPSULE, EXTENDED RELEASE ORAL DAILY
Refills: 0 | Status: ACTIVE | COMMUNITY

## 2025-09-19 LAB
ALBUMIN SERPL ELPH-MCNC: 4.6 G/DL
ALP BLD-CCNC: 78 U/L
ALT SERPL-CCNC: 21 U/L
ANION GAP SERPL CALC-SCNC: 12 MMOL/L
AST SERPL-CCNC: 21 U/L
BASOPHILS # BLD AUTO: 0.03 K/UL
BASOPHILS NFR BLD AUTO: 0.6 %
BILIRUB SERPL-MCNC: 0.3 MG/DL
BUN SERPL-MCNC: 9 MG/DL
CALCIUM SERPL-MCNC: 10 MG/DL
CHLORIDE SERPL-SCNC: 105 MMOL/L
CHOLEST SERPL-MCNC: 202 MG/DL
CO2 SERPL-SCNC: 26 MMOL/L
CREAT SERPL-MCNC: 0.69 MG/DL
EGFRCR SERPLBLD CKD-EPI 2021: 101 ML/MIN/1.73M2
EOSINOPHIL # BLD AUTO: 0.08 K/UL
EOSINOPHIL NFR BLD AUTO: 1.6 %
ESTIMATED AVERAGE GLUCOSE: 134 MG/DL
GLUCOSE SERPL-MCNC: 92 MG/DL
HBA1C MFR BLD HPLC: 6.3 %
HCT VFR BLD CALC: 45.8 %
HDLC SERPL-MCNC: 57 MG/DL
HGB BLD-MCNC: 14.1 G/DL
IMM GRANULOCYTES NFR BLD AUTO: 0.2 %
LDLC SERPL-MCNC: 115 MG/DL
LYMPHOCYTES # BLD AUTO: 1.55 K/UL
LYMPHOCYTES NFR BLD AUTO: 30.5 %
MAN DIFF?: NORMAL
MCHC RBC-ENTMCNC: 27.6 PG
MCHC RBC-ENTMCNC: 30.8 G/DL
MCV RBC AUTO: 89.8 FL
MONOCYTES # BLD AUTO: 0.42 K/UL
MONOCYTES NFR BLD AUTO: 8.3 %
NEUTROPHILS # BLD AUTO: 3 K/UL
NEUTROPHILS NFR BLD AUTO: 58.8 %
NONHDLC SERPL-MCNC: 145 MG/DL
PLATELET # BLD AUTO: 208 K/UL
POTASSIUM SERPL-SCNC: 5.2 MMOL/L
PROT SERPL-MCNC: 7.7 G/DL
RBC # BLD: 5.1 M/UL
RBC # FLD: 14.1 %
SODIUM SERPL-SCNC: 143 MMOL/L
TRIGL SERPL-MCNC: 175 MG/DL
TSH SERPL-ACNC: 2.44 UIU/ML
VIT B12 SERPL-MCNC: 399 PG/ML
WBC # FLD AUTO: 5.09 K/UL